# Patient Record
Sex: FEMALE | Race: WHITE | NOT HISPANIC OR LATINO | URBAN - METROPOLITAN AREA
[De-identification: names, ages, dates, MRNs, and addresses within clinical notes are randomized per-mention and may not be internally consistent; named-entity substitution may affect disease eponyms.]

---
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
## 2023-12-05 ENCOUNTER — OFFICE VISIT (OUTPATIENT)
Dept: OBSTETRICS AND GYNECOLOGY | Facility: CLINIC | Age: 18
End: 2023-12-05
Payer: COMMERCIAL

## 2023-12-05 VITALS
SYSTOLIC BLOOD PRESSURE: 121 MMHG | BODY MASS INDEX: 18.27 KG/M2 | HEART RATE: 90 BPM | DIASTOLIC BLOOD PRESSURE: 76 MMHG | WEIGHT: 107 LBS | HEIGHT: 64 IN

## 2023-12-05 DIAGNOSIS — Z00.00 ENCOUNTER FOR WELLNESS EXAMINATION: Primary | ICD-10-CM

## 2023-12-05 DIAGNOSIS — N94.10 DYSPAREUNIA IN FEMALE: ICD-10-CM

## 2023-12-05 PROCEDURE — 3074F SYST BP LT 130 MM HG: CPT | Mod: CPTII,S$GLB,, | Performed by: OBSTETRICS & GYNECOLOGY

## 2023-12-05 PROCEDURE — 3078F DIAST BP <80 MM HG: CPT | Mod: CPTII,S$GLB,, | Performed by: OBSTETRICS & GYNECOLOGY

## 2023-12-05 PROCEDURE — 3008F PR BODY MASS INDEX (BMI) DOCUMENTED: ICD-10-PCS | Mod: CPTII,S$GLB,, | Performed by: OBSTETRICS & GYNECOLOGY

## 2023-12-05 PROCEDURE — 1159F MED LIST DOCD IN RCRD: CPT | Mod: CPTII,S$GLB,, | Performed by: OBSTETRICS & GYNECOLOGY

## 2023-12-05 PROCEDURE — 99395 PR PREVENTIVE VISIT,EST,18-39: ICD-10-PCS | Mod: S$GLB,,, | Performed by: OBSTETRICS & GYNECOLOGY

## 2023-12-05 PROCEDURE — 1159F PR MEDICATION LIST DOCUMENTED IN MEDICAL RECORD: ICD-10-PCS | Mod: CPTII,S$GLB,, | Performed by: OBSTETRICS & GYNECOLOGY

## 2023-12-05 PROCEDURE — 3074F PR MOST RECENT SYSTOLIC BLOOD PRESSURE < 130 MM HG: ICD-10-PCS | Mod: CPTII,S$GLB,, | Performed by: OBSTETRICS & GYNECOLOGY

## 2023-12-05 PROCEDURE — 3008F BODY MASS INDEX DOCD: CPT | Mod: CPTII,S$GLB,, | Performed by: OBSTETRICS & GYNECOLOGY

## 2023-12-05 PROCEDURE — 3078F PR MOST RECENT DIASTOLIC BLOOD PRESSURE < 80 MM HG: ICD-10-PCS | Mod: CPTII,S$GLB,, | Performed by: OBSTETRICS & GYNECOLOGY

## 2023-12-05 PROCEDURE — 99395 PREV VISIT EST AGE 18-39: CPT | Mod: S$GLB,,, | Performed by: OBSTETRICS & GYNECOLOGY

## 2023-12-05 RX ORDER — NORGESTIMATE AND ETHINYL ESTRADIOL 7DAYSX3 LO
1 KIT ORAL DAILY
Qty: 90 TABLET | Refills: 3 | Status: SHIPPED | OUTPATIENT
Start: 2023-12-05 | End: 2024-12-04

## 2023-12-05 NOTE — PROGRESS NOTES
Subjective:      Patient ID: Ally Orourke is a 18 y.o. female.    Chief Complaint:  Well Woman and Dyspareunia      History of Present Illness  HPI  This is a 18 year old female coming in for her annual exam. She also has complaints of painful intercourse      GYN & OB History  Patient's last menstrual period was 2023 (exact date).   Date of Last Pap: No result found    OB History    Para Term  AB Living   0 0 0 0 0 0   SAB IAB Ectopic Multiple Live Births   0 0 0 0 0       Review of Systems  Review of Systems   Constitutional:  Negative for fatigue, fever and unexpected weight change.   Respiratory:  Negative for cough and shortness of breath.    Cardiovascular:  Negative for chest pain, palpitations and leg swelling.   Gastrointestinal:  Negative for abdominal pain, bloating, blood in stool, constipation, diarrhea, nausea and vomiting.   Genitourinary:  Positive for dyspareunia. Negative for decreased libido, dysmenorrhea, dysuria, frequency, genital sores, hematuria, menorrhagia, menstrual problem, pelvic pain, urgency, vaginal discharge, urinary incontinence and vaginal odor.        Pain with intercourse with deep insertion also some pelvic pain in between her cycles she does report her cycles are better on birth control the last 3 days with bleeding only heavier on the 1st day   Musculoskeletal:  Negative for arthralgias and joint swelling.   Integumentary:  Negative for rash, mole/lesion, breast mass, nipple discharge, breast skin changes and breast tenderness.   Psychiatric/Behavioral: Negative.     Breast: Negative for asymmetry, lump, mass, nipple discharge, skin changes and tenderness         Objective:     Physical Exam:   Constitutional: She appears well-developed and well-nourished.      Neck: No thyroid mass and no thyromegaly present.    Cardiovascular:  Normal rate and regular rhythm.                  Abdominal: Soft. Bowel sounds are normal. There is no abdominal tenderness.                   Skin: Skin is warm and dry.    Psychiatric: She has a normal mood and affect. Her speech is normal and behavior is normal. She is attentive.         Assessment:     1. Encounter for wellness examination    2. Dyspareunia in female              Plan:     Encounter for wellness examination    Dyspareunia in female  -     US OB/GYN Procedure ([facility]); Future  The patient declined pelvic exam today she is on her menstrual cycles she will come back for an ultrasound and a pelvic exam to assess her causes of painful intercourse  Other orders  -     norgestimate-ethinyl estradioL (ORTHO TRI-CYCLEN LO) 0.18/0.215/0.25 mg-25 mcg tablet; Take 1 tablet by mouth once daily.  Dispense: 90 tablet; Refill: 3

## 2023-12-20 ENCOUNTER — PROCEDURE VISIT (OUTPATIENT)
Dept: OBSTETRICS AND GYNECOLOGY | Facility: CLINIC | Age: 18
End: 2023-12-20
Payer: COMMERCIAL

## 2023-12-20 ENCOUNTER — OFFICE VISIT (OUTPATIENT)
Dept: OBSTETRICS AND GYNECOLOGY | Facility: CLINIC | Age: 18
End: 2023-12-20
Payer: COMMERCIAL

## 2023-12-20 VITALS
DIASTOLIC BLOOD PRESSURE: 83 MMHG | HEIGHT: 64 IN | SYSTOLIC BLOOD PRESSURE: 129 MMHG | WEIGHT: 108.19 LBS | HEART RATE: 103 BPM | BODY MASS INDEX: 18.47 KG/M2

## 2023-12-20 DIAGNOSIS — N94.819 VULVODYNIA: ICD-10-CM

## 2023-12-20 DIAGNOSIS — N94.10 DYSPAREUNIA IN FEMALE: ICD-10-CM

## 2023-12-20 DIAGNOSIS — N94.10 DYSPAREUNIA IN FEMALE: Primary | ICD-10-CM

## 2023-12-20 PROCEDURE — 99214 OFFICE O/P EST MOD 30 MIN: CPT | Mod: 25,S$GLB,, | Performed by: OBSTETRICS & GYNECOLOGY

## 2023-12-20 PROCEDURE — 3008F PR BODY MASS INDEX (BMI) DOCUMENTED: ICD-10-PCS | Mod: CPTII,S$GLB,, | Performed by: OBSTETRICS & GYNECOLOGY

## 2023-12-20 PROCEDURE — 3074F PR MOST RECENT SYSTOLIC BLOOD PRESSURE < 130 MM HG: ICD-10-PCS | Mod: CPTII,S$GLB,, | Performed by: OBSTETRICS & GYNECOLOGY

## 2023-12-20 PROCEDURE — 76830 US OB/GYN PROCEDURE (VIEWPOINT): ICD-10-PCS | Mod: S$GLB,,, | Performed by: OBSTETRICS & GYNECOLOGY

## 2023-12-20 PROCEDURE — 3079F PR MOST RECENT DIASTOLIC BLOOD PRESSURE 80-89 MM HG: ICD-10-PCS | Mod: CPTII,S$GLB,, | Performed by: OBSTETRICS & GYNECOLOGY

## 2023-12-20 PROCEDURE — 76830 TRANSVAGINAL US NON-OB: CPT | Mod: S$GLB,,, | Performed by: OBSTETRICS & GYNECOLOGY

## 2023-12-20 PROCEDURE — 3074F SYST BP LT 130 MM HG: CPT | Mod: CPTII,S$GLB,, | Performed by: OBSTETRICS & GYNECOLOGY

## 2023-12-20 PROCEDURE — 1159F MED LIST DOCD IN RCRD: CPT | Mod: CPTII,S$GLB,, | Performed by: OBSTETRICS & GYNECOLOGY

## 2023-12-20 PROCEDURE — 3079F DIAST BP 80-89 MM HG: CPT | Mod: CPTII,S$GLB,, | Performed by: OBSTETRICS & GYNECOLOGY

## 2023-12-20 PROCEDURE — 99214 PR OFFICE/OUTPT VISIT, EST, LEVL IV, 30-39 MIN: ICD-10-PCS | Mod: 25,S$GLB,, | Performed by: OBSTETRICS & GYNECOLOGY

## 2023-12-20 PROCEDURE — 3008F BODY MASS INDEX DOCD: CPT | Mod: CPTII,S$GLB,, | Performed by: OBSTETRICS & GYNECOLOGY

## 2023-12-20 PROCEDURE — 1159F PR MEDICATION LIST DOCUMENTED IN MEDICAL RECORD: ICD-10-PCS | Mod: CPTII,S$GLB,, | Performed by: OBSTETRICS & GYNECOLOGY

## 2023-12-20 RX ORDER — AMITRIPTYLINE HYDROCHLORIDE 10 MG/1
TABLET, FILM COATED ORAL
Qty: 30 TABLET | Refills: 2 | Status: SHIPPED | OUTPATIENT
Start: 2023-12-20

## 2023-12-20 NOTE — PROGRESS NOTES
Subjective:      Patient ID: Ally Orourke is a 18 y.o. female.    Chief Complaint:  Dyspareunia (Follow up on Dyspareunia)      History of Present Illness  HPI  Patient presents today with complaints of follow-up of painful intercourse patient had an ultrasound today.  She reports pain with initial insertion and deep insertion.  She also reports some pain in between her cycles.    GYN & OB History  Patient's last menstrual period was 2023 (exact date).   Date of Last Pap: No result found    OB History    Para Term  AB Living   0 0 0 0 0 0   SAB IAB Ectopic Multiple Live Births   0 0 0 0 0       Review of Systems  Review of Systems   Gastrointestinal:  Negative for abdominal pain, bloating, blood in stool, constipation, diarrhea, nausea, vomiting, reflux and fecal incontinence.   Genitourinary:  Positive for dyspareunia and pelvic pain. Negative for bladder incontinence, decreased libido, dysmenorrhea, dysuria, flank pain, frequency, genital sores, hematuria, hot flashes, menorrhagia, menstrual problem, urgency, vaginal bleeding, vaginal discharge, vaginal pain, urinary incontinence, postcoital bleeding, postmenopausal bleeding, vaginal dryness and vaginal odor.        They between cycles pain with intercourse deep insertion and initial insertion.          Objective:     Physical Exam:   Constitutional: Vital signs are normal. She appears well-developed and well-nourished.               Genitourinary:    Vagina normal.      Pelvic exam was performed with patient supine.     Labial bartholins normal.Cervix is normal. Right adnexum displays no mass, no tenderness and no fullness. Left adnexum displays no mass, no tenderness and no fullness. No erythema, vaginal discharge, tenderness, bleeding, rectocele, cystocele or prolapse of vaginal walls in the vagina.    No foreign body in the vagina.      No signs of injury in the vagina.   Cervix exhibits no motion tenderness, no discharge and no  friability. Uterus is tender.    Genitourinary Comments: Pain at the introitus as well as levator tenderness on the right more than the left right adnexal discomfort as well as uterine tenderness.                    Chaperone present   Indication   ========   Indication: Abdominal/Pelvic Pain GYN     Method   ======   Transvaginal ultrasound examination     Uterus   ======   Uterus: Visualized   Uterus position: anteverted   Uterus length 74 mm   Uterus width 44 mm   Uterus height 35 mm   Uterus Vol 59.2 cmÂ³   Endometrial thickness, total 1.6 mm     Right Ovary   =========   Rt ovary: Visualized   Rt ovary D1 28 mm   Rt ovary D2 22 mm   Rt ovary D3 19 mm   Rt ovary Vol 5.9 cmÂ³     Left Ovary   ========   Lt ovary: Visualized   Lt ovary D1 28 mm   Lt ovary D2 22 mm   Lt ovary D3 19 mm   Lt ovary Vol 6.0 cmÂ³     Impression   =========   study appears wnl                                                       Sonographer: Jossie Colvin   Electronically Signed by: Dalia Mena at 12/20/2023-14:02      Assessment:     1. Dyspareunia in female    2. Vulvodynia              Plan:     Dyspareunia in female    Vulvodynia    Other orders  -     amitriptyline (ELAVIL) 10 MG tablet; Amytriptyline/baclofen/gabpenten/lidocaine please convert to Vulvodynia cream Sig pea sized amount vaginally up to three times a day as needed  Dispense: 30 tablet; Refill: 2      We would extensive discussion regarding pelvic pain and the different sources we discussed possible levator syndrome as well as vulvodynia and also discussed the possibility of endometriosis which she has a family history.  We discussed the only way to diagnose endometriosis however was do a laparoscopy at which point the patient has declined at this time.  We also discussed switching her to a continuous birth control which she declined as well.  At this time the patient will try the vulvodynia gel that has been prescribed for her we also discussed the possibility of  pelvic physical therapy which she also declined at this time.  We discussed her calling back to change her mind for any of the above therapies.

## 2024-10-15 ENCOUNTER — PATIENT MESSAGE (OUTPATIENT)
Dept: OBSTETRICS AND GYNECOLOGY | Facility: CLINIC | Age: 19
End: 2024-10-15
Payer: COMMERCIAL

## 2024-10-15 RX ORDER — NORGESTIMATE AND ETHINYL ESTRADIOL 7DAYSX3 LO
1 KIT ORAL DAILY
Qty: 28 TABLET | Refills: 2 | Status: SHIPPED | OUTPATIENT
Start: 2024-10-15 | End: 2025-10-15

## 2025-01-02 ENCOUNTER — OFFICE VISIT (OUTPATIENT)
Dept: OBSTETRICS AND GYNECOLOGY | Facility: CLINIC | Age: 20
End: 2025-01-02
Payer: COMMERCIAL

## 2025-01-02 VITALS
WEIGHT: 115 LBS | HEIGHT: 64 IN | BODY MASS INDEX: 19.63 KG/M2 | HEART RATE: 105 BPM | SYSTOLIC BLOOD PRESSURE: 123 MMHG | DIASTOLIC BLOOD PRESSURE: 79 MMHG

## 2025-01-02 DIAGNOSIS — N94.819 VULVODYNIA: ICD-10-CM

## 2025-01-02 DIAGNOSIS — Z00.00 ENCOUNTER FOR WELLNESS EXAMINATION: ICD-10-CM

## 2025-01-02 DIAGNOSIS — Z12.4 SCREENING FOR MALIGNANT NEOPLASM OF CERVIX: Primary | ICD-10-CM

## 2025-01-02 RX ORDER — NORGESTIMATE AND ETHINYL ESTRADIOL 7DAYSX3 LO
1 KIT ORAL DAILY
Qty: 84 TABLET | Refills: 3 | Status: SHIPPED | OUTPATIENT
Start: 2025-01-02 | End: 2026-01-02

## 2025-01-02 NOTE — PROGRESS NOTES
Subjective     Patient ID: Ally Orourke is a 19 y.o. female.    Chief Complaint:  Well Woman      History of Present Illness  HPI  This is a 19 year old female coming in for her annual exam. She also has complaints of some pain with insertion with intercourse she reports her cycles have been okay occasionally has she has some cramping with her cycles     GYN & OB History  Patient's last menstrual period was 2024.   Date of Last Pap: No result found    OB History    Para Term  AB Living   0 0 0 0 0 0   SAB IAB Ectopic Multiple Live Births   0 0 0 0 0       Review of Systems  Review of Systems   Constitutional:  Negative for fatigue, fever and unexpected weight change.   Respiratory:  Negative for cough and shortness of breath.    Cardiovascular:  Negative for chest pain, palpitations and leg swelling.   Gastrointestinal:  Negative for abdominal pain, bloating, blood in stool, constipation, diarrhea, nausea and vomiting.   Genitourinary:  Positive for dyspareunia. Negative for decreased libido, dysmenorrhea, dysuria, frequency, genital sores, hematuria, menorrhagia, menstrual problem, pelvic pain, urgency, vaginal discharge, urinary incontinence and vaginal odor.   Musculoskeletal:  Negative for arthralgias and joint swelling.   Integumentary:  Negative for rash, mole/lesion, breast mass, nipple discharge, breast skin changes and breast tenderness.   Psychiatric/Behavioral: Negative.     Breast: Negative for asymmetry, lump, mass, nipple discharge, skin changes and tenderness         Objective   Physical Exam:   Constitutional: She appears well-developed and well-nourished.      Neck: No thyroid mass and no thyromegaly present.     Pulmonary/Chest: Chest wall is not dull to percussion. She exhibits no mass, no tenderness, no bony tenderness, no laceration, no crepitus, no edema, no deformity, no swelling and no retraction. Right breast exhibits no inverted nipple, no mass, no nipple discharge, no  skin change, no tenderness, presence, no bleeding and no swelling. Left breast exhibits no inverted nipple, no mass, no nipple discharge, no skin change, no tenderness, presence, no bleeding and no swelling.        Abdominal: Soft. Bowel sounds are normal. There is no abdominal tenderness. Hernia confirmed negative in the right inguinal area and confirmed negative in the left inguinal area.     Genitourinary:    Vagina and uterus normal.      Pelvic exam was performed with patient supine.     Labial bartholins normal.There is no rash, tenderness, lesion or injury on the right labia. There is no rash, tenderness, lesion or injury on the left labia. Cervix is normal. Right adnexum displays no mass, no tenderness and no fullness. Left adnexum displays no mass, no tenderness and no fullness. No rectocele, cystocele or prolapse of vaginal walls in the vagina.    Genitourinary Comments: Patient reports some pain externally on exam no pain on internal exam at all on the levators which have been noted prior                  Skin: Skin is warm and dry.    Psychiatric: She has a normal mood and affect. Her speech is normal and behavior is normal.    Chaperone present           Assessment and Plan     1. Screening for malignant neoplasm of cervix    2. Encounter for wellness examination    3. Vulvodynia            Plan:  Screening for malignant neoplasm of cervix  -     Liquid-based pap smear, screening    Encounter for wellness examination    Vulvodynia  The patient will try the kiwi external manipulation device, as she had tried some medication prior which did not really help her.  Other orders  -     norgestimate-ethinyl estradioL (ORTHO TRI-CYCLEN LO) 0.18/0.215/0.25 mg-25 mcg tablet; Take 1 tablet by mouth once daily.  Dispense: 84 tablet; Refill: 3

## 2025-01-28 RX ORDER — NORGESTIMATE AND ETHINYL ESTRADIOL 7DAYSX3 LO
1 KIT ORAL DAILY
Qty: 84 TABLET | Refills: 3 | Status: SHIPPED | OUTPATIENT
Start: 2025-01-28 | End: 2026-01-28